# Patient Record
Sex: FEMALE | URBAN - METROPOLITAN AREA
[De-identification: names, ages, dates, MRNs, and addresses within clinical notes are randomized per-mention and may not be internally consistent; named-entity substitution may affect disease eponyms.]

---

## 2019-03-09 ENCOUNTER — HOSPITAL ENCOUNTER (EMERGENCY)
Facility: HOSPITAL | Age: 44
Discharge: LEFT BEFORE DISCHARGE | End: 2019-03-09
Attending: EMERGENCY MEDICINE
Payer: MEDICARE

## 2019-03-09 VITALS
RESPIRATION RATE: 17 BRPM | DIASTOLIC BLOOD PRESSURE: 92 MMHG | HEIGHT: 63 IN | OXYGEN SATURATION: 100 % | TEMPERATURE: 98 F | HEART RATE: 96 BPM | SYSTOLIC BLOOD PRESSURE: 143 MMHG | BODY MASS INDEX: 34.55 KG/M2 | WEIGHT: 195 LBS

## 2019-03-09 DIAGNOSIS — Z53.21 PATIENT LEFT WITHOUT BEING SEEN: Primary | ICD-10-CM

## 2019-03-10 NOTE — ED INITIAL ASSESSMENT (HPI)
Pt brought in by EMS from [facility] for c/o anxiety, per pt she felt anxious and scared because some of the residents have treatening behavior, per pt she doesn't have any past medical history , not taking any medicine in a daily basis, pt denies SI/HI.

## 2019-03-10 NOTE — ED NOTES
Pt wants to leave at this time because she wants to catch the last [address] [facility] train, Dr Jose Cabrera talked to her and pt states she wants to leave now,Dr Jose Cabrera aware.

## 2020-05-02 ENCOUNTER — APPOINTMENT (OUTPATIENT)
Dept: GENERAL RADIOLOGY | Age: 45
End: 2020-05-02
Attending: EMERGENCY MEDICINE

## 2020-05-02 ENCOUNTER — HOSPITAL ENCOUNTER (EMERGENCY)
Age: 45
Discharge: HOME | End: 2020-05-02
Attending: EMERGENCY MEDICINE

## 2020-05-02 VITALS
HEIGHT: 63 IN | BODY MASS INDEX: 35.5 KG/M2 | OXYGEN SATURATION: 100 % | TEMPERATURE: 98.4 F | HEART RATE: 101 BPM | DIASTOLIC BLOOD PRESSURE: 69 MMHG | RESPIRATION RATE: 20 BRPM | SYSTOLIC BLOOD PRESSURE: 136 MMHG | WEIGHT: 200.38 LBS

## 2020-05-02 DIAGNOSIS — Z13.9 ENCOUNTER FOR MEDICAL SCREENING EXAMINATION: Primary | ICD-10-CM

## 2020-05-02 PROCEDURE — A0380 BASIC LIFE SUPPORT MILEAGE: HCPCS

## 2020-05-02 PROCEDURE — A0429 BLS-EMERGENCY: HCPCS

## 2020-05-02 PROCEDURE — 99283 EMERGENCY DEPT VISIT LOW MDM: CPT | Performed by: EMERGENCY MEDICINE

## 2020-05-02 PROCEDURE — 99285 EMERGENCY DEPT VISIT HI MDM: CPT

## 2020-05-02 RX ORDER — ALPRAZOLAM 1 MG/1
1 TABLET ORAL NIGHTLY PRN
Status: ON HOLD | COMMUNITY
End: 2021-05-19 | Stop reason: HOSPADM

## 2020-05-02 ASSESSMENT — PAIN SCALES - GENERAL
PAINLEVEL_OUTOF10: 0
PAINLEVEL_OUTOF10: 0

## 2021-05-06 ENCOUNTER — HOSPITAL ENCOUNTER (INPATIENT)
Age: 46
LOS: 13 days | Discharge: HOME | DRG: 885 | End: 2021-05-19
Attending: EMERGENCY MEDICINE | Admitting: PSYCHIATRY & NEUROLOGY

## 2021-05-06 DIAGNOSIS — F29 PSYCHOSIS, UNSPECIFIED PSYCHOSIS TYPE (CMD): Primary | ICD-10-CM

## 2021-05-06 DIAGNOSIS — F17.200 SMOKING: ICD-10-CM

## 2021-05-06 LAB
ALBUMIN SERPL-MCNC: 3.5 G/DL (ref 3.6–5.1)
ALBUMIN/GLOB SERPL: 0.9 {RATIO} (ref 1–2.4)
ALP SERPL-CCNC: 114 UNITS/L (ref 45–117)
ALT SERPL-CCNC: 15 UNITS/L
ANION GAP SERPL CALC-SCNC: 13 MMOL/L (ref 10–20)
AST SERPL-CCNC: 16 UNITS/L
BASOPHILS # BLD: 0 K/MCL (ref 0–0.3)
BASOPHILS NFR BLD: 0 %
BILIRUB SERPL-MCNC: 0.3 MG/DL (ref 0.2–1)
BUN SERPL-MCNC: 11 MG/DL (ref 6–20)
BUN/CREAT SERPL: 14 (ref 7–25)
CALCIUM SERPL-MCNC: 8.7 MG/DL (ref 8.4–10.2)
CHLORIDE SERPL-SCNC: 106 MMOL/L (ref 98–107)
CO2 SERPL-SCNC: 24 MMOL/L (ref 21–32)
CREAT SERPL-MCNC: 0.77 MG/DL (ref 0.51–0.95)
DEPRECATED RDW RBC: 45.4 FL (ref 39–50)
EOSINOPHIL # BLD: 0.1 K/MCL (ref 0–0.5)
EOSINOPHIL NFR BLD: 1 %
ERYTHROCYTE [DISTWIDTH] IN BLOOD: 14.8 % (ref 11–15)
ETHANOL SERPL-MCNC: NORMAL MG/DL
FASTING DURATION TIME PATIENT: ABNORMAL H
GFR SERPLBLD BASED ON 1.73 SQ M-ARVRAT: >90 ML/MIN/1.73M2
GLOBULIN SER-MCNC: 3.7 G/DL (ref 2–4)
GLUCOSE SERPL-MCNC: 83 MG/DL (ref 65–99)
HCG SERPL QL: NEGATIVE
HCT VFR BLD CALC: 33.6 % (ref 36–46.5)
HGB BLD-MCNC: 10.9 G/DL (ref 12–15.5)
IMM GRANULOCYTES # BLD AUTO: 0 K/MCL (ref 0–0.2)
IMM GRANULOCYTES # BLD: 1 %
LYMPHOCYTES # BLD: 1.6 K/MCL (ref 1–4.8)
LYMPHOCYTES NFR BLD: 25 %
MCH RBC QN AUTO: 27.3 PG (ref 26–34)
MCHC RBC AUTO-ENTMCNC: 32.4 G/DL (ref 32–36.5)
MCV RBC AUTO: 84 FL (ref 78–100)
MONOCYTES # BLD: 1 K/MCL (ref 0.3–0.9)
MONOCYTES NFR BLD: 15 %
NEUTROPHILS # BLD: 3.9 K/MCL (ref 1.8–7.7)
NEUTROPHILS NFR BLD: 58 %
NRBC BLD MANUAL-RTO: 0 /100 WBC
PLATELET # BLD AUTO: 268 K/MCL (ref 140–450)
POTASSIUM SERPL-SCNC: 3.3 MMOL/L (ref 3.4–5.1)
PROT SERPL-MCNC: 7.2 G/DL (ref 6.4–8.2)
RBC # BLD: 4 MIL/MCL (ref 4–5.2)
SARS-COV-2 RNA RESP QL NAA+PROBE: NOT DETECTED
SERVICE CMNT-IMP: NORMAL
SERVICE CMNT-IMP: NORMAL
SODIUM SERPL-SCNC: 140 MMOL/L (ref 135–145)
WBC # BLD: 6.6 K/MCL (ref 4.2–11)

## 2021-05-06 PROCEDURE — 99284 EMERGENCY DEPT VISIT MOD MDM: CPT

## 2021-05-06 PROCEDURE — 84703 CHORIONIC GONADOTROPIN ASSAY: CPT | Performed by: EMERGENCY MEDICINE

## 2021-05-06 PROCEDURE — 99285 EMERGENCY DEPT VISIT HI MDM: CPT | Performed by: EMERGENCY MEDICINE

## 2021-05-06 PROCEDURE — 87635 SARS-COV-2 COVID-19 AMP PRB: CPT | Performed by: EMERGENCY MEDICINE

## 2021-05-06 PROCEDURE — H2011 CRISIS INTERVEN SVC, 15 MIN: HCPCS

## 2021-05-06 PROCEDURE — 10004577 HB ROOM CHARGE PSYCH

## 2021-05-06 PROCEDURE — C9803 HOPD COVID-19 SPEC COLLECT: HCPCS

## 2021-05-06 PROCEDURE — 96372 THER/PROPH/DIAG INJ SC/IM: CPT

## 2021-05-06 PROCEDURE — H2000 COMP MULTIDISIPLN EVALUATION: HCPCS

## 2021-05-06 PROCEDURE — 85025 COMPLETE CBC W/AUTO DIFF WBC: CPT | Performed by: EMERGENCY MEDICINE

## 2021-05-06 PROCEDURE — 10002800 HB RX 250 W HCPCS: Performed by: EMERGENCY MEDICINE

## 2021-05-06 PROCEDURE — 80053 COMPREHEN METABOLIC PANEL: CPT | Performed by: EMERGENCY MEDICINE

## 2021-05-06 PROCEDURE — 82077 ASSAY SPEC XCP UR&BREATH IA: CPT | Performed by: EMERGENCY MEDICINE

## 2021-05-06 RX ORDER — HALOPERIDOL 5 MG/ML
5 INJECTION INTRAMUSCULAR ONCE
Status: COMPLETED | OUTPATIENT
Start: 2021-05-06 | End: 2021-05-06

## 2021-05-06 RX ORDER — DIPHENHYDRAMINE HYDROCHLORIDE 50 MG/ML
25 INJECTION INTRAMUSCULAR; INTRAVENOUS ONCE
Status: COMPLETED | OUTPATIENT
Start: 2021-05-06 | End: 2021-05-06

## 2021-05-06 RX ORDER — MIDAZOLAM HYDROCHLORIDE 1 MG/ML
5 INJECTION, SOLUTION INTRAMUSCULAR; INTRAVENOUS ONCE
Status: COMPLETED | OUTPATIENT
Start: 2021-05-06 | End: 2021-05-06

## 2021-05-06 RX ADMIN — DIPHENHYDRAMINE HYDROCHLORIDE 25 MG: 50 INJECTION INTRAMUSCULAR; INTRAVENOUS at 18:15

## 2021-05-06 RX ADMIN — MIDAZOLAM HYDROCHLORIDE 5 MG: 1 INJECTION, SOLUTION INTRAMUSCULAR; INTRAVENOUS at 18:15

## 2021-05-06 RX ADMIN — HALOPERIDOL LACTATE 5 MG: 5 INJECTION INTRAMUSCULAR at 18:15

## 2021-05-06 ASSESSMENT — COGNITIVE AND FUNCTIONAL STATUS - GENERAL
AFFECT: UNCOOPERATIVE
PERCEPTUAL_MISINTERPRETATIONS_HALLUCINATIONS: AUDITORY
MOOD: IRRITABLE
LEVEL_OF_CONSCIOUSNESS_CALCULATED: ALERT
ATTENTION_CALCULATED: REDUCED ABILITY TO MAINTAIN ATTENTION TO STIMULI
MOTOR_BEHAVIOR-AGITATION_CALCULATED: 1;2
MEMORY: INTACT
SPEECH: RAMBLING SPEECH;DIMINISHED RATE CONTROL
ORIENTATION: ORIENTED (PERSON/PLACE/TIME)

## 2021-05-06 ASSESSMENT — PAIN SCALES - WONG BAKER: WONGBAKER_NUMERICALRESPONSE: 0

## 2021-05-07 PROBLEM — F12.10 MARIJUANA ABUSE: Status: ACTIVE | Noted: 2021-05-07

## 2021-05-07 PROBLEM — F29 PSYCHOTIC DISORDER (CMD): Status: ACTIVE | Noted: 2021-05-07

## 2021-05-07 LAB
RAINBOW EXTRA TUBES HOLD SPECIMEN: NORMAL
RAINBOW EXTRA TUBES HOLD SPECIMEN: NORMAL

## 2021-05-07 PROCEDURE — 10004577 HB ROOM CHARGE PSYCH

## 2021-05-07 PROCEDURE — 90792 PSYCH DIAG EVAL W/MED SRVCS: CPT | Performed by: PSYCHIATRY & NEUROLOGY

## 2021-05-07 PROCEDURE — 99222 1ST HOSP IP/OBS MODERATE 55: CPT | Performed by: INTERNAL MEDICINE

## 2021-05-07 RX ORDER — ACETAMINOPHEN 325 MG/1
650 TABLET ORAL EVERY 4 HOURS PRN
Status: DISCONTINUED | OUTPATIENT
Start: 2021-05-07 | End: 2021-05-19 | Stop reason: HOSPADM

## 2021-05-07 RX ORDER — LORAZEPAM 2 MG/1
2 TABLET ORAL EVERY 4 HOURS PRN
Status: DISCONTINUED | OUTPATIENT
Start: 2021-05-07 | End: 2021-05-19 | Stop reason: HOSPADM

## 2021-05-07 RX ORDER — BENZTROPINE MESYLATE 1 MG/ML
1 INJECTION INTRAMUSCULAR; INTRAVENOUS 2 TIMES DAILY PRN
Status: DISCONTINUED | OUTPATIENT
Start: 2021-05-07 | End: 2021-05-19 | Stop reason: HOSPADM

## 2021-05-07 RX ORDER — BENZTROPINE MESYLATE 1 MG/1
1 TABLET ORAL 2 TIMES DAILY PRN
Status: DISCONTINUED | OUTPATIENT
Start: 2021-05-07 | End: 2021-05-19 | Stop reason: HOSPADM

## 2021-05-07 RX ORDER — HALOPERIDOL 5 MG/1
5 TABLET ORAL EVERY 4 HOURS PRN
Status: DISCONTINUED | OUTPATIENT
Start: 2021-05-07 | End: 2021-05-19 | Stop reason: HOSPADM

## 2021-05-07 RX ORDER — TRAZODONE HYDROCHLORIDE 50 MG/1
50 TABLET ORAL NIGHTLY PRN
Status: DISCONTINUED | OUTPATIENT
Start: 2021-05-07 | End: 2021-05-19 | Stop reason: HOSPADM

## 2021-05-07 RX ORDER — HALOPERIDOL 5 MG/ML
5 INJECTION INTRAMUSCULAR EVERY 4 HOURS PRN
Status: DISCONTINUED | OUTPATIENT
Start: 2021-05-07 | End: 2021-05-19 | Stop reason: HOSPADM

## 2021-05-07 RX ORDER — HYDROXYZINE HYDROCHLORIDE 25 MG/1
50 TABLET, FILM COATED ORAL EVERY 4 HOURS PRN
Status: DISCONTINUED | OUTPATIENT
Start: 2021-05-07 | End: 2021-05-19 | Stop reason: HOSPADM

## 2021-05-07 RX ORDER — LORAZEPAM 2 MG/ML
2 INJECTION INTRAMUSCULAR EVERY 4 HOURS PRN
Status: DISCONTINUED | OUTPATIENT
Start: 2021-05-07 | End: 2021-05-19 | Stop reason: HOSPADM

## 2021-05-07 RX ORDER — MAGNESIUM HYDROXIDE/ALUMINUM HYDROXICE/SIMETHICONE 120; 1200; 1200 MG/30ML; MG/30ML; MG/30ML
20 SUSPENSION ORAL EVERY 4 HOURS PRN
Status: DISCONTINUED | OUTPATIENT
Start: 2021-05-07 | End: 2021-05-19 | Stop reason: HOSPADM

## 2021-05-07 ASSESSMENT — LIFESTYLE VARIABLES
IS PATIENT ABLE TO COMPLETE ASSESSMENT AT THIS TIME: NO (T)
ALCOHOL_ROUTE: PO
TOBACCO_USE_STATUS_IN_THE_LAST_30_DAYS: NO TOBACCO USED IN THE LAST 30 DAYS
ADL BEFORE ADMISSION: INDEPENDENT
AUDIT-C TOTAL SCORE: 1
ALCOHOL_USE: YES
ARE YOU DEAF OR DO YOU HAVE SERIOUS DIFFICULTY  HEARING: NO
RECENT DECLINE IN ADLS: NO
AMOUNT_OF_TOBACCO_USED: FOUR OR FEWER CIGARETTES AND/OR SMOKELESS TOBACCO AND/OR SMOKED TOBACCO BUT NOT DAILY
ARE YOU BLIND OR DO YOU HAVE SERIOUS DIFFICULTY SEEING, EVEN WHEN WEARING GLASSES: NO
ADL NEEDS ASSIST: NO
HOW OFTEN DO YOU HAVE A DRINK CONTAINING ALCOHOL: MONTHLY OR LESS
SHORT OF BREATH OR FATIGUE WITH ADLS: NO
HAVE YOU EVER BEEN VERBALLY, EMOTIONALLY, PHYSICALLY OR SEXUALLY ABUSED, OR ABUSED VIA SOCIAL MEDIA?: NO
TYPE_OF_TOBACCO_USED: CIGARETTES
HAVE YOU EVER BEEN EXPOSED TO OTHER VERY DISTURBING, TRAUMATIC OR ANXIETY PROVOKING EVENTS IN YOUR LIFETIME?: NO
HOW OFTEN DO YOU HAVE 6 OR MORE DRINKS ON ONE OCCASION: NEVER
CHRONIC/CANCER PAIN PRESENT: NO
TOBACCO_USE_STATUS_IN_THE_LAST_30_DAYS: USED TOBACCO IN THE LAST 30 DAYS
HOW MANY STANDARD DRINKS CONTAINING ALCOHOL DO YOU HAVE ON A TYPICAL DAY: 0,1 OR 2
ALCOHOL_USE_STATUS: NO OR LOW RISK WITH VALIDATED TOOL

## 2021-05-07 ASSESSMENT — PAIN SCALES - GENERAL
PAINLEVEL_OUTOF10: 0

## 2021-05-07 ASSESSMENT — ACTIVITIES OF DAILY LIVING (ADL): ADL_SCORE: 12

## 2021-05-08 PROCEDURE — 10004577 HB ROOM CHARGE PSYCH

## 2021-05-08 ASSESSMENT — PAIN SCALES - GENERAL: PAINLEVEL_OUTOF10: 0

## 2021-05-09 PROCEDURE — 10004577 HB ROOM CHARGE PSYCH

## 2021-05-09 ASSESSMENT — PAIN SCALES - GENERAL
PAINLEVEL_OUTOF10: 0

## 2021-05-10 PROCEDURE — 99233 SBSQ HOSP IP/OBS HIGH 50: CPT | Performed by: PSYCHIATRY & NEUROLOGY

## 2021-05-10 PROCEDURE — 10002803 HB RX 637: Performed by: PSYCHIATRY & NEUROLOGY

## 2021-05-10 PROCEDURE — 10004577 HB ROOM CHARGE PSYCH

## 2021-05-10 PROCEDURE — 10002800 HB RX 250 W HCPCS: Performed by: PSYCHIATRY & NEUROLOGY

## 2021-05-10 RX ORDER — OLANZAPINE 10 MG/1
10 TABLET ORAL NIGHTLY
Status: DISCONTINUED | OUTPATIENT
Start: 2021-05-10 | End: 2021-05-13

## 2021-05-10 RX ADMIN — LORAZEPAM 2 MG: 2 INJECTION INTRAMUSCULAR; INTRAVENOUS at 13:02

## 2021-05-10 RX ADMIN — HALOPERIDOL LACTATE 5 MG: 5 INJECTION, SOLUTION INTRAMUSCULAR at 13:02

## 2021-05-10 RX ADMIN — OLANZAPINE 10 MG: 10 TABLET ORAL at 21:28

## 2021-05-10 ASSESSMENT — PAIN SCALES - GENERAL: PAINLEVEL_OUTOF10: 0

## 2021-05-11 PROCEDURE — 10002803 HB RX 637: Performed by: PSYCHIATRY & NEUROLOGY

## 2021-05-11 PROCEDURE — 10004577 HB ROOM CHARGE PSYCH

## 2021-05-11 PROCEDURE — 99232 SBSQ HOSP IP/OBS MODERATE 35: CPT | Performed by: PSYCHIATRY & NEUROLOGY

## 2021-05-11 RX ADMIN — OLANZAPINE 10 MG: 10 TABLET ORAL at 20:36

## 2021-05-11 ASSESSMENT — PAIN SCALES - GENERAL
PAINLEVEL_OUTOF10: 0
PAINLEVEL_OUTOF10: 0

## 2021-05-11 ASSESSMENT — PAIN SCALES - WONG BAKER: WONGBAKER_NUMERICALRESPONSE: 0

## 2021-05-12 PROCEDURE — 10004577 HB ROOM CHARGE PSYCH

## 2021-05-12 PROCEDURE — 99232 SBSQ HOSP IP/OBS MODERATE 35: CPT | Performed by: PSYCHIATRY & NEUROLOGY

## 2021-05-12 PROCEDURE — 10002800 HB RX 250 W HCPCS: Performed by: PSYCHIATRY & NEUROLOGY

## 2021-05-12 PROCEDURE — 10002803 HB RX 637: Performed by: PSYCHIATRY & NEUROLOGY

## 2021-05-12 RX ORDER — OLANZAPINE 5 MG/1
5 TABLET ORAL 3 TIMES DAILY PRN
Status: DISCONTINUED | OUTPATIENT
Start: 2021-05-12 | End: 2021-05-19 | Stop reason: HOSPADM

## 2021-05-12 RX ADMIN — LORAZEPAM 2 MG: 2 INJECTION INTRAMUSCULAR; INTRAVENOUS at 22:11

## 2021-05-12 RX ADMIN — HALOPERIDOL LACTATE 5 MG: 5 INJECTION, SOLUTION INTRAMUSCULAR at 22:11

## 2021-05-12 RX ADMIN — OLANZAPINE 10 MG: 10 TABLET ORAL at 21:17

## 2021-05-12 ASSESSMENT — PAIN SCALES - GENERAL
PAINLEVEL_OUTOF10: 0

## 2021-05-12 ASSESSMENT — PAIN SCALES - WONG BAKER: WONGBAKER_NUMERICALRESPONSE: 0

## 2021-05-13 PROCEDURE — 10002803 HB RX 637: Performed by: PSYCHIATRY & NEUROLOGY

## 2021-05-13 PROCEDURE — 10004577 HB ROOM CHARGE PSYCH

## 2021-05-13 PROCEDURE — 99232 SBSQ HOSP IP/OBS MODERATE 35: CPT | Performed by: PSYCHIATRY & NEUROLOGY

## 2021-05-13 RX ADMIN — OLANZAPINE 15 MG: 10 TABLET, FILM COATED ORAL at 21:28

## 2021-05-13 ASSESSMENT — PAIN SCALES - GENERAL: PAINLEVEL_OUTOF10: 0

## 2021-05-14 PROCEDURE — 10004577 HB ROOM CHARGE PSYCH

## 2021-05-14 PROCEDURE — 99232 SBSQ HOSP IP/OBS MODERATE 35: CPT | Performed by: PSYCHIATRY & NEUROLOGY

## 2021-05-14 PROCEDURE — 10002803 HB RX 637: Performed by: PSYCHIATRY & NEUROLOGY

## 2021-05-14 PROCEDURE — 10002800 HB RX 250 W HCPCS: Performed by: PSYCHIATRY & NEUROLOGY

## 2021-05-14 RX ADMIN — OLANZAPINE 15 MG: 10 TABLET, FILM COATED ORAL at 21:09

## 2021-05-14 RX ADMIN — LORAZEPAM 2 MG: 2 INJECTION INTRAMUSCULAR; INTRAVENOUS at 10:44

## 2021-05-14 RX ADMIN — HALOPERIDOL LACTATE 5 MG: 5 INJECTION, SOLUTION INTRAMUSCULAR at 10:44

## 2021-05-14 ASSESSMENT — PAIN SCALES - GENERAL
PAINLEVEL_OUTOF10: 0

## 2021-05-14 ASSESSMENT — PAIN SCALES - WONG BAKER
WONGBAKER_NUMERICALRESPONSE: 0
WONGBAKER_NUMERICALRESPONSE: 0

## 2021-05-15 PROCEDURE — 10002803 HB RX 637: Performed by: PSYCHIATRY & NEUROLOGY

## 2021-05-15 PROCEDURE — 10004577 HB ROOM CHARGE PSYCH

## 2021-05-15 RX ADMIN — OLANZAPINE 15 MG: 10 TABLET, FILM COATED ORAL at 21:02

## 2021-05-15 ASSESSMENT — PAIN SCALES - GENERAL
PAINLEVEL_OUTOF10: 0

## 2021-05-16 PROCEDURE — 10004577 HB ROOM CHARGE PSYCH

## 2021-05-16 PROCEDURE — 10002803 HB RX 637: Performed by: PSYCHIATRY & NEUROLOGY

## 2021-05-16 RX ADMIN — OLANZAPINE 15 MG: 10 TABLET, FILM COATED ORAL at 20:46

## 2021-05-16 ASSESSMENT — PAIN SCALES - WONG BAKER
WONGBAKER_NUMERICALRESPONSE: 0
WONGBAKER_NUMERICALRESPONSE: 0

## 2021-05-16 ASSESSMENT — PAIN SCALES - GENERAL
PAINLEVEL_OUTOF10: 0

## 2021-05-17 PROCEDURE — 10004577 HB ROOM CHARGE PSYCH

## 2021-05-17 PROCEDURE — 99232 SBSQ HOSP IP/OBS MODERATE 35: CPT | Performed by: PSYCHIATRY & NEUROLOGY

## 2021-05-17 PROCEDURE — 10002803 HB RX 637: Performed by: PSYCHIATRY & NEUROLOGY

## 2021-05-17 RX ADMIN — OLANZAPINE 15 MG: 10 TABLET, FILM COATED ORAL at 20:59

## 2021-05-18 PROCEDURE — 10004577 HB ROOM CHARGE PSYCH

## 2021-05-18 PROCEDURE — 99232 SBSQ HOSP IP/OBS MODERATE 35: CPT | Performed by: PSYCHIATRY & NEUROLOGY

## 2021-05-18 PROCEDURE — 10002803 HB RX 637: Performed by: PSYCHIATRY & NEUROLOGY

## 2021-05-18 RX ADMIN — OLANZAPINE 15 MG: 10 TABLET, FILM COATED ORAL at 20:12

## 2021-05-18 ASSESSMENT — PAIN SCALES - GENERAL
PAINLEVEL_OUTOF10: 0
PAINLEVEL_OUTOF10: 0

## 2021-05-18 ASSESSMENT — PAIN SCALES - WONG BAKER: WONGBAKER_NUMERICALRESPONSE: 0

## 2021-05-19 VITALS
OXYGEN SATURATION: 96 % | TEMPERATURE: 98.4 F | RESPIRATION RATE: 18 BRPM | BODY MASS INDEX: 35.51 KG/M2 | DIASTOLIC BLOOD PRESSURE: 87 MMHG | HEART RATE: 85 BPM | SYSTOLIC BLOOD PRESSURE: 121 MMHG | WEIGHT: 200.4 LBS | HEIGHT: 63 IN

## 2021-05-19 PROBLEM — F31.2 BIPOLAR DISORDER, CURRENT EPISODE MANIC SEVERE WITH PSYCHOTIC FEATURES (CMD): Status: ACTIVE | Noted: 2021-05-19

## 2021-05-19 PROCEDURE — 99232 SBSQ HOSP IP/OBS MODERATE 35: CPT | Performed by: INTERNAL MEDICINE

## 2021-05-19 PROCEDURE — 99238 HOSP IP/OBS DSCHRG MGMT 30/<: CPT | Performed by: PSYCHIATRY & NEUROLOGY

## 2021-05-19 RX ORDER — OLANZAPINE 15 MG/1
15 TABLET ORAL NIGHTLY
Qty: 30 TABLET | Refills: 0 | Status: SHIPPED | OUTPATIENT
Start: 2021-05-19 | End: 2021-06-18